# Patient Record
(demographics unavailable — no encounter records)

---

## 2024-10-13 NOTE — PHYSICAL EXAM
[FreeTextEntry1] : Physical Exam Appears to be uncomfortable touching left lower abdomen area.  Healed scars at site of prior shingles, left lower abdomen extending to left lower back and to left perineal area.  Mood appropriate  Mental Status Awake, alert and oriented to person, NOT place, NOT time and situation. Provides detailed history. Fluent Speech in [place]. Niece is translating and providing collateral information.  Visuospatial: NOT able to draw clock, NOT with hands and NOT intersecting cube. Neglect absent.  10/10/24 MOCA 9/30 (not administered by [place] speaking dylan, niece translating).  Delayed recall 1/5 Executive 1/5 Registration 1/3 Serial 7's 0/3   Cranial Nerves II: VFF III, IV, VI: PERRL, EOMI. V: Facial sensation is normal B/L. VII: Facial strength is symmetric. VIII: wnl to voice.  IX, X: Palate is midline and elevates symmetrically. XI: Trapezius normal strength. XII: Tongue midline without atrophy or fasciculations.  MOTOR EXAM Muscle tone - no evidence of rigidity or resistance in all 4 extremities. No atrophy or fasciculations. Muscle Strength: arms and legs, proximal and distal flexors and extensors are normal. No UE drift.   REFLEXES All present, normal, and symmetrical Right Plantar: mute. Left Plantar: mute.   COORDINATION Finger to nose: Normal. Heel to shin:    SENSORY Vibration: intact  Sensation: light touch, pin prick distribution and cold intact   GAIT Mild unsteadiness while ambulating. Unsteady Tandem Walk. Difficulty with toe and heel walk. Romberg negative.

## 2024-10-13 NOTE — HISTORY OF PRESENT ILLNESS
[FreeTextEntry1] : Ms. NAHED RODRIGUEZ is a 75 year old female here today in neurology consultation of memory impairment. Hx DM, HTN.    NAHED is escorted today by her niece Merissa Hatch who reports pt has had memory impairment for over on year, has gotten lost taking a bus to her friend's home which is not typical, repeats herself and is frustrated with poor memory. Pt also experienced shingles a few weeks ago, the area is now healed and she continues to experience severe pain at the site uncontrolled with gabapentin 100mg 2x daily.  CDPAS is michael Leblanc.  HCP is michael Hatch [phone]  Pt requests her niece translate today; Z[place]ian speaking.   Onset & Duration - Summer 2023    Symptoms & Behavioral Changes - getting lost, doesn't remember places, forgot what bus to take last year 2023 going to her friend's home, repeating herself, frustration with poor memory, short term memory loss.    Precipitating Factors: no recent illness or stress reported that has impacted memory.    Psychiatric Hx - depression; sertraline.    FHx - dementia denies    SHx -  completed education up to 2nd grade. supportive extended family, lives alone, retired, non-smoker, no substances, no alcohol.  Medication Hx - lisinopril, metformin, gabapentin , omeprazole, sertraline, Linzess, famotidine    Diagnostic Testing -  none    Neuropsychological Testing - none    PCP Dr Steven Longoria  Urologist Dr Mary JUNIOR [address]

## 2024-10-13 NOTE — ASSESSMENT
[FreeTextEntry1] : Ms. NAHED RODRIGUEZ is a 75 year old female here today in neurology consultation of memory impairment for over 12 months. Completed education to 2nd grade. MOCA 9/30. Neuropsychological testing with Citizen [place] clinician is requested; testing should consider educational level reached. Pt is experiencing post herpetic neuralgia following shingles a few week ago pain not controlled on gabapentin 100mg 2x daily; no sedation.  Pt is unsteady on her feet.   PLAN OF CARE - MRI BRAIN waw ordered  Lab work ordered Neuropsychological testing in [place] EKG (may consider Aricept later) PT ordered for unsteadiness Lidoderm patch 12 hours on and 12 hours off for post herpetic neuralgia Increase gabapentin 100mg; take 3 tabs every 12 hours for post herpetic neuralgia. Decrease 2 tabs if too sedating and lower if necessary. Niece agrees to supervise the medication.   Neurology follow up in 3 months with Maral Solorio NP.  Water- goal 64oz water daily (8 cups) Exercise- goal 30 mins x 5 days weekly. Consider a brisk walk Sleep hygiene recommended   Hypertension - monitor home BP, log results and bring to PCP visits. BP goal < 130/80 Lipid panel - 12 hour fasting lipid panel. Goal LDL < 100.  Recommendations for Brain Health - - healthy eating/diet with diet rich in fiber, fruits, vegetables, and low in saturated fats, processed foods. - good sleep, 7-8 hours a night. No use of phone or watching television before bed. - aerobic exercise as tolerated, at least 30 minutes, such as a brisk walk 3-4 times a week. - keep mind active with puzzles, reading, and socializing with others. - abstaining from excess alcohol, or drug use. - blood pressure, cholesterol, blood glucose monitoring to prevent microvascular disease which can lead to cognitive impairment. - obtain adequate water intake. 8 cups of water daily or as recommended by your pcp if fluid restricted related to medical condition.

## 2024-10-13 NOTE — ASSESSMENT
[FreeTextEntry1] : Ms. NAHED RODRIGUEZ is a 75 year old female here today in neurology consultation of memory impairment for over 12 months. Completed education to 2nd grade. MOCA 9/30. Neuropsychological testing with [place] clinician is requested; testing should consider educational level reached. Pt is experiencing post herpetic neuralgia following shingles a few week ago pain not controlled on gabapentin 100mg 2x daily; no sedation.  Pt is unsteady on her feet.   PLAN OF CARE - MRI BRAIN waw ordered  Lab work ordered Neuropsychological testing in [place] EKG (may consider Aricept later) PT ordered for unsteadiness Lidoderm patch 12 hours on and 12 hours off for post herpetic neuralgia Increase gabapentin 100mg; take 3 tabs every 12 hours for post herpetic neuralgia. Decrease 2 tabs if too sedating and lower if necessary. Niece agrees to supervise the medication.   Neurology follow up in 3 months with Maral Solorio NP.  Water- goal 64oz water daily (8 cups) Exercise- goal 30 mins x 5 days weekly. Consider a brisk walk Sleep hygiene recommended   Hypertension - monitor home BP, log results and bring to PCP visits. BP goal < 130/80 Lipid panel - 12 hour fasting lipid panel. Goal LDL < 100.  Recommendations for Brain Health - - healthy eating/diet with diet rich in fiber, fruits, vegetables, and low in saturated fats, processed foods. - good sleep, 7-8 hours a night. No use of phone or watching television before bed. - aerobic exercise as tolerated, at least 30 minutes, such as a brisk walk 3-4 times a week. - keep mind active with puzzles, reading, and socializing with others. - abstaining from excess alcohol, or drug use. - blood pressure, cholesterol, blood glucose monitoring to prevent microvascular disease which can lead to cognitive impairment. - obtain adequate water intake. 8 cups of water daily or as recommended by your pcp if fluid restricted related to medical condition.

## 2024-10-13 NOTE — HISTORY OF PRESENT ILLNESS
[FreeTextEntry1] : Ms. NAHED RODRIGUEZ is a 75 year old female here today in neurology consultation of memory impairment. Hx DM, HTN.    NAHED is escorted today by her niece Merissa Hatch who reports pt has had memory impairment for over on year, has gotten lost taking a bus to her friend's home which is not typical, repeats herself and is frustrated with poor memory. Pt also experienced shingles a few weeks ago, the area is now healed and she continues to experience severe pain at the site uncontrolled with gabapentin 100mg 2x daily.  CDPAS is michael Leblanc.  HCP is michael Hatch [phone]  Pt requests her niece translate today; Macedonian speaking.   Onset & Duration - Summer 2023    Symptoms & Behavioral Changes - getting lost, doesn't remember places, forgot what bus to take last year 2023 going to her friend's home, repeating herself, frustration with poor memory, short term memory loss.    Precipitating Factors: no recent illness or stress reported that has impacted memory.    Psychiatric Hx - depression; sertraline.    FHx - dementia denies    SHx -  completed education up to 2nd grade. supportive extended family, lives alone, retired, non-smoker, no substances, no alcohol.  Medication Hx - lisinopril, metformin, gabapentin , omeprazole, sertraline, Linzess, famotidine    Diagnostic Testing -  none    Neuropsychological Testing - none    PCP Dr Steven Longoria  Urologist Dr Mary JUNIOR [address]

## 2024-10-13 NOTE — ASSESSMENT
[FreeTextEntry1] : Ms. NAHED RODRIGUEZ is a 75 year old female here today in neurology consultation of memory impairment for over 12 months. Completed education to 2nd grade. MOCA 9/30. Neuropsychological testing with [place] clinician is requested; testing should consider educational level reached. Pt is experiencing post herpetic neuralgia following shingles a few week ago pain not controlled on gabapentin 100mg 2x daily; no sedation.  Pt is unsteady on her feet.   PLAN OF CARE - MRI BRAIN waw ordered  Lab work ordered Neuropsychological testing in [place] EKG (may consider Aricept later) PT ordered for unsteadiness Lidoderm patch 12 hours on and 12 hours off for post herpetic neuralgia Increase gabapentin 100mg; take 3 tabs every 12 hours for post herpetic neuralgia. Decrease 2 tabs if too sedating and lower if necessary. Niece agrees to supervise the medication.   Neurology follow up in 3 months with Maral Solorio NP.  Water- goal 64oz water daily (8 cups) Exercise- goal 30 mins x 5 days weekly. Consider a brisk walk Sleep hygiene recommended   Hypertension - monitor home BP, log results and bring to PCP visits. BP goal < 130/80 Lipid panel - 12 hour fasting lipid panel. Goal LDL < 100.  Recommendations for Brain Health - - healthy eating/diet with diet rich in fiber, fruits, vegetables, and low in saturated fats, processed foods. - good sleep, 7-8 hours a night. No use of phone or watching television before bed. - aerobic exercise as tolerated, at least 30 minutes, such as a brisk walk 3-4 times a week. - keep mind active with puzzles, reading, and socializing with others. - abstaining from excess alcohol, or drug use. - blood pressure, cholesterol, blood glucose monitoring to prevent microvascular disease which can lead to cognitive impairment. - obtain adequate water intake. 8 cups of water daily or as recommended by your pcp if fluid restricted related to medical condition.

## 2024-10-13 NOTE — ASSESSMENT
[FreeTextEntry1] : Ms. NAHED RODRIGUEZ is a 75 year old female here today in neurology consultation of memory impairment for over 12 months. Completed education to 2nd grade. MOCA 9/30. Neuropsychological testing with Chinese clinician is requested; testing should consider educational level reached. Pt is experiencing post herpetic neuralgia following shingles a few week ago pain not controlled on gabapentin 100mg 2x daily; no sedation.  Pt is unsteady on her feet.   PLAN OF CARE - MRI BRAIN waw ordered  Lab work ordered Neuropsychological testing in [place] EKG (may consider Aricept later) PT ordered for unsteadiness Lidoderm patch 12 hours on and 12 hours off for post herpetic neuralgia Increase gabapentin 100mg; take 3 tabs every 12 hours for post herpetic neuralgia. Decrease 2 tabs if too sedating and lower if necessary. Niece agrees to supervise the medication.   Neurology follow up in 3 months with Maral Solorio NP.  Water- goal 64oz water daily (8 cups) Exercise- goal 30 mins x 5 days weekly. Consider a brisk walk Sleep hygiene recommended   Hypertension - monitor home BP, log results and bring to PCP visits. BP goal < 130/80 Lipid panel - 12 hour fasting lipid panel. Goal LDL < 100.  Recommendations for Brain Health - - healthy eating/diet with diet rich in fiber, fruits, vegetables, and low in saturated fats, processed foods. - good sleep, 7-8 hours a night. No use of phone or watching television before bed. - aerobic exercise as tolerated, at least 30 minutes, such as a brisk walk 3-4 times a week. - keep mind active with puzzles, reading, and socializing with others. - abstaining from excess alcohol, or drug use. - blood pressure, cholesterol, blood glucose monitoring to prevent microvascular disease which can lead to cognitive impairment. - obtain adequate water intake. 8 cups of water daily or as recommended by your pcp if fluid restricted related to medical condition.

## 2024-10-13 NOTE — HISTORY OF PRESENT ILLNESS
[FreeTextEntry1] : Ms. NAHED RODRIGUEZ is a 75 year old female here today in neurology consultation of memory impairment. Hx DM, HTN.    NAHED is escorted today by her niece Merissa Hatch who reports pt has had memory impairment for over on year, has gotten lost taking a bus to her friend's home which is not typical, repeats herself and is frustrated with poor memory. Pt also experienced shingles a few weeks ago, the area is now healed and she continues to experience severe pain at the site uncontrolled with gabapentin 100mg 2x daily.  CDPAS is michael Leblanc.  HCP is michael Hatch [phone]  Pt requests her niece translate today; Lao speaking.   Onset & Duration - Summer 2023    Symptoms & Behavioral Changes - getting lost, doesn't remember places, forgot what bus to take last year 2023 going to her friend's home, repeating herself, frustration with poor memory, short term memory loss.    Precipitating Factors: no recent illness or stress reported that has impacted memory.    Psychiatric Hx - depression; sertraline.    FHx - dementia denies    SHx -  completed education up to 2nd grade. supportive extended family, lives alone, retired, non-smoker, no substances, no alcohol.  Medication Hx - lisinopril, metformin, gabapentin , omeprazole, sertraline, Linzess, famotidine    Diagnostic Testing -  none    Neuropsychological Testing - none    PCP Dr Steven Longoria  Urologist Dr Mary JUNIOR [address]

## 2024-10-13 NOTE — PHYSICAL EXAM
[FreeTextEntry1] : Physical Exam Appears to be uncomfortable touching left lower abdomen area.  Healed scars at site of prior shingles, left lower abdomen extending to left lower back and to left perineal area.  Mood appropriate  Mental Status Awake, alert and oriented to person, NOT place, NOT time and situation. Provides detailed history. Fluent Speech in [place] citizen. Niece is translating and providing collateral information.  Visuospatial: NOT able to draw clock, NOT with hands and NOT intersecting cube. Neglect absent.  10/10/24 MOCA 9/30 (not administered by [place] citizen speaking dylan, niece translating).  Delayed recall 1/5 Executive 1/5 Registration 1/3 Serial 7's 0/3   Cranial Nerves II: VFF III, IV, VI: PERRL, EOMI. V: Facial sensation is normal B/L. VII: Facial strength is symmetric. VIII: wnl to voice.  IX, X: Palate is midline and elevates symmetrically. XI: Trapezius normal strength. XII: Tongue midline without atrophy or fasciculations.  MOTOR EXAM Muscle tone - no evidence of rigidity or resistance in all 4 extremities. No atrophy or fasciculations. Muscle Strength: arms and legs, proximal and distal flexors and extensors are normal. No UE drift.   REFLEXES All present, normal, and symmetrical Right Plantar: mute. Left Plantar: mute.   COORDINATION Finger to nose: Normal. Heel to shin:    SENSORY Vibration: intact  Sensation: light touch, pin prick distribution and cold intact   GAIT Mild unsteadiness while ambulating. Unsteady Tandem Walk. Difficulty with toe and heel walk. Romberg negative.

## 2024-10-13 NOTE — PHYSICAL EXAM
[FreeTextEntry1] : Physical Exam Appears to be uncomfortable touching left lower abdomen area.  Healed scars at site of prior shingles, left lower abdomen extending to left lower back and to left perineal area.  Mood appropriate  Mental Status Awake, alert and oriented to person, NOT place, NOT time and situation. Provides detailed history. Fluent Speech in [place]. Niece is translating and providing collateral information.  Visuospatial: NOT able to draw clock, NOT with hands and NOT intersecting cube. Neglect absent.  10/10/24 MOCA 9/30 (not administered by [place] speaking dylan, niece translating).  Delayed recall 1/5 Executive 1/5 Registration 1/3 Serial 7's 0/3   Cranial Nerves II: VFF III, IV, VI: PERRL, EOMI. V: Facial sensation is normal B/L. VII: Facial strength is symmetric. VIII: wnl to voice.  IX, X: Palate is midline and elevates symmetrically. XI: Trapezius normal strength. XII: Tongue midline without atrophy or fasciculations.  MOTOR EXAM Muscle tone - no evidence of rigidity or resistance in all 4 extremities. No atrophy or fasciculations. Muscle Strength: arms and legs, proximal and distal flexors and extensors are normal. No UE drift.   REFLEXES All present, normal, and symmetrical Right Plantar: mute. Left Plantar: mute.   COORDINATION Finger to nose: Normal. Heel to shin:    SENSORY Vibration: intact  Sensation: light touch, pin prick distribution and cold intact   GAIT Mild unsteadiness while ambulating. Unsteady Tandem Walk. Difficulty with toe and heel walk. Romberg negative.

## 2024-10-13 NOTE — HISTORY OF PRESENT ILLNESS
[FreeTextEntry1] : Ms. NAHED RODRIGUEZ is a 75 year old female here today in neurology consultation of memory impairment. Hx DM, HTN.    NAHED is escorted today by her niece Merissa Hatch who reports pt has had memory impairment for over on year, has gotten lost taking a bus to her friend's home which is not typical, repeats herself and is frustrated with poor memory. Pt also experienced shingles a few weeks ago, the area is now healed and she continues to experience severe pain at the site uncontrolled with gabapentin 100mg 2x daily.  CDPAS is michael Leblanc.  HCP is michael Hatch [phone]  Pt requests her niece translate today; Uzbek speaking.   Onset & Duration - Summer 2023    Symptoms & Behavioral Changes - getting lost, doesn't remember places, forgot what bus to take last year 2023 going to her friend's home, repeating herself, frustration with poor memory, short term memory loss.    Precipitating Factors: no recent illness or stress reported that has impacted memory.    Psychiatric Hx - depression; sertraline.    FHx - dementia denies    SHx -  completed education up to 2nd grade. supportive extended family, lives alone, retired, non-smoker, no substances, no alcohol.  Medication Hx - lisinopril, metformin, gabapentin , omeprazole, sertraline, Linzess, famotidine    Diagnostic Testing -  none    Neuropsychological Testing - none    PCP Dr Steven Longoria  Urologist Dr Mary JUNIOR [address]

## 2024-10-13 NOTE — PHYSICAL EXAM
[FreeTextEntry1] : Physical Exam Appears to be uncomfortable touching left lower abdomen area.  Healed scars at site of prior shingles, left lower abdomen extending to left lower back and to left perineal area.  Mood appropriate  Mental Status Awake, alert and oriented to person, NOT place, NOT time and situation. Provides detailed history. Fluent Speech in Ukrainian. Niece is translating and providing collateral information.  Visuospatial: NOT able to draw clock, NOT with hands and NOT intersecting cube. Neglect absent.  10/10/24 MOCA 9/30 (not administered by Ukrainian speaking dylan, niece translating).  Delayed recall 1/5 Executive 1/5 Registration 1/3 Serial 7's 0/3   Cranial Nerves II: VFF III, IV, VI: PERRL, EOMI. V: Facial sensation is normal B/L. VII: Facial strength is symmetric. VIII: wnl to voice.  IX, X: Palate is midline and elevates symmetrically. XI: Trapezius normal strength. XII: Tongue midline without atrophy or fasciculations.  MOTOR EXAM Muscle tone - no evidence of rigidity or resistance in all 4 extremities. No atrophy or fasciculations. Muscle Strength: arms and legs, proximal and distal flexors and extensors are normal. No UE drift.   REFLEXES All present, normal, and symmetrical Right Plantar: mute. Left Plantar: mute.   COORDINATION Finger to nose: Normal. Heel to shin:    SENSORY Vibration: intact  Sensation: light touch, pin prick distribution and cold intact   GAIT Mild unsteadiness while ambulating. Unsteady Tandem Walk. Difficulty with toe and heel walk. Romberg negative.